# Patient Record
Sex: FEMALE | Race: ASIAN | NOT HISPANIC OR LATINO | Employment: FULL TIME | URBAN - METROPOLITAN AREA
[De-identification: names, ages, dates, MRNs, and addresses within clinical notes are randomized per-mention and may not be internally consistent; named-entity substitution may affect disease eponyms.]

---

## 2019-08-20 ENCOUNTER — ANCILLARY PROCEDURE (OUTPATIENT)
Dept: ULTRASOUND IMAGING | Facility: CLINIC | Age: 24
End: 2019-08-20
Attending: PHYSICIAN ASSISTANT
Payer: COMMERCIAL

## 2019-08-20 DIAGNOSIS — R10.2 PAIN IN PELVIS: ICD-10-CM

## 2019-09-28 ENCOUNTER — ANCILLARY PROCEDURE (OUTPATIENT)
Dept: ULTRASOUND IMAGING | Facility: CLINIC | Age: 24
End: 2019-09-28
Attending: PHYSICIAN ASSISTANT
Payer: COMMERCIAL

## 2019-09-28 DIAGNOSIS — N83.201 CYSTS OF BOTH OVARIES: ICD-10-CM

## 2019-09-28 DIAGNOSIS — N83.202 CYSTS OF BOTH OVARIES: ICD-10-CM

## 2023-01-25 ENCOUNTER — OFFICE VISIT (OUTPATIENT)
Dept: URGENT CARE | Facility: URGENT CARE | Age: 28
End: 2023-01-25
Payer: COMMERCIAL

## 2023-01-25 VITALS
OXYGEN SATURATION: 95 % | TEMPERATURE: 98.2 F | RESPIRATION RATE: 16 BRPM | DIASTOLIC BLOOD PRESSURE: 69 MMHG | WEIGHT: 121 LBS | HEART RATE: 73 BPM | SYSTOLIC BLOOD PRESSURE: 99 MMHG

## 2023-01-25 DIAGNOSIS — S61.011A THUMB LACERATION, RIGHT, INITIAL ENCOUNTER: ICD-10-CM

## 2023-01-25 DIAGNOSIS — S61.214A LACERATION OF RIGHT RING FINGER WITHOUT FOREIGN BODY WITHOUT DAMAGE TO NAIL, INITIAL ENCOUNTER: Primary | ICD-10-CM

## 2023-01-25 PROCEDURE — 12002 RPR S/N/AX/GEN/TRNK2.6-7.5CM: CPT | Performed by: NURSE PRACTITIONER

## 2023-01-25 RX ORDER — BUPROPION HYDROCHLORIDE 75 MG/1
75 TABLET ORAL
COMMUNITY

## 2023-01-25 RX ORDER — ESCITALOPRAM OXALATE 10 MG/1
TABLET ORAL
COMMUNITY
Start: 2022-12-08

## 2023-01-25 RX ORDER — ESCITALOPRAM OXALATE 10 MG/1
10 TABLET ORAL DAILY
COMMUNITY
Start: 2022-07-18

## 2023-01-25 NOTE — PROGRESS NOTES
SUBJECTIVE:     Chief Complaint   Patient presents with     Urgent Care     Laceration     Rt ring finger and thumb cut around noon.     Subjective     Kalie Kelly is an 27 year old female who presents to clinic today for laceration to right ring finger and thumb via a serrated bread knife 2 hours ago.  She washed it out with soap and water then 70% alcohol and applied pressure.    Associated symptoms: Denies numbness, weakness, or loss of function  Last tetanus booster within 10 years: yes    EXAM:   The patient appears today in alert, no acute distress and alert,no apparent distress distress  VITALS: BP 99/69   Pulse 73   Temp 98.2  F (36.8  C) (Temporal)   Resp 16   Wt 54.9 kg (121 lb)   SpO2 95%     Size of lacerations:  #1 on the right ring finger is 1.2 cm, #2 laceration on the right thumb is 1.8 cm   Characteristics of the laceration: Both lacerations are jagged  Tendon function intact: yes  Sensation to light touch intact: yes  Pulses intact: yes  Picture included in patient's chart: no    Assessment:    ICD-10-CM    1. Laceration of right ring finger without foreign body without damage to nail, initial encounter  S61.214A       2. Thumb laceration, right, initial encounter  S61.011A           PLAN:  PROCEDURE NOTE::  Both wounds was locally injected with 2 cc's of Lidocaine 1% plain  Good anesthesia was obtained  Prepped and draped in the usual sterile fashion  Wound cleaned with HIBICLENS  Wound soaked  Wound irrigated  #1 laceration was closed using 4.0 Ethilon suture, #2 laceration was also closed using 4.0 Ethilon suture  After care instructions:  Keep wound clean and dry for the next 24-48 hours  Sutures out in 7 days  Signs of infection discussed today  Apply anti-bacterial ointment for 3 days  May return to work as long as wound is kept clean and dry  Active range of motion exercises encouraged    SHARIF Chambers, CNP  [facility] Provider.

## 2023-01-25 NOTE — PATIENT INSTRUCTIONS
After care instructions:  Keep wound clean and dry for the next 24-48 hours, after that time you may shower normally then pat dry  No submerging the hand in any water for any length of time, no dishes no pools no spas no lakes.  Sutures out in 7 days  Signs of infection discussed today  Apply anti-bacterial ointment for 3 days  May return to work as long as wound is kept clean and dry

## 2023-02-01 ENCOUNTER — OFFICE VISIT (OUTPATIENT)
Dept: URGENT CARE | Facility: URGENT CARE | Age: 28
End: 2023-02-01
Payer: COMMERCIAL

## 2023-02-01 DIAGNOSIS — Z53.9 ERRONEOUS ENCOUNTER--DISREGARD: Primary | ICD-10-CM
